# Patient Record
Sex: FEMALE | Race: WHITE | NOT HISPANIC OR LATINO | Employment: STUDENT | URBAN - METROPOLITAN AREA
[De-identification: names, ages, dates, MRNs, and addresses within clinical notes are randomized per-mention and may not be internally consistent; named-entity substitution may affect disease eponyms.]

---

## 2017-02-20 ENCOUNTER — GENERIC CONVERSION - ENCOUNTER (OUTPATIENT)
Dept: OTHER | Facility: OTHER | Age: 27
End: 2017-02-20

## 2017-03-24 ENCOUNTER — ALLSCRIPTS OFFICE VISIT (OUTPATIENT)
Dept: OTHER | Facility: OTHER | Age: 27
End: 2017-03-24

## 2017-06-16 ENCOUNTER — ALLSCRIPTS OFFICE VISIT (OUTPATIENT)
Dept: OTHER | Facility: OTHER | Age: 27
End: 2017-06-16

## 2017-09-18 ENCOUNTER — ALLSCRIPTS OFFICE VISIT (OUTPATIENT)
Dept: OTHER | Facility: OTHER | Age: 27
End: 2017-09-18

## 2017-09-18 DIAGNOSIS — F98.8 OTHER SPECIFIED BEHAVIORAL AND EMOTIONAL DISORDERS WITH ONSET USUALLY OCCURRING IN CHILDHOOD AND ADOLESCENCE: ICD-10-CM

## 2017-12-08 ENCOUNTER — ALLSCRIPTS OFFICE VISIT (OUTPATIENT)
Dept: OTHER | Facility: OTHER | Age: 27
End: 2017-12-08

## 2018-01-10 NOTE — PSYCH
Psych Med Mgmt    Appearance: was calm and cooperative, adequate hygiene and grooming and good eye contact  Observed mood: anxious  Observed mood: affect appropriate  Speech: a normal rate and fluent  Thought processes: coherent/organized  Hallucinations: no hallucinations present  Thought Content: no delusions  Abnormal Thoughts: The patient has no suicidal thoughts and no homicidal thoughts  Orientation: The patient is oriented to person, place and time, oriented to person, oriented to place and oriented to time  Recent and Remote Memory: short term memory intact and long term memory intact  Attention Span And Concentration: concentration impaired  Insight: Limited insight  Judgment: Her judgment was limited  Muscle Strength And Tone  Muscle strength and tone were normal    The patient is experiencing no localized pain  Initial Evaluation provided today  Treatment Recommendations: Trial of Strattera 40 mg qam and increase to 80 mg after 3 days   Continue Alprazolam 0 5 mg qd prn  Risks, Benefits And Possible Side Effects Of Medications: Risks, benefits, and possible side effects of medications explained to patient and patient verbalizes understanding  She reports normal appetite, normal energy level, no weight change and normal number of sleep hours  Patient used to see Dr Rosalba Cloud for 2 years and her last tx was with Adderall and Wellbutrin and she ended having a seizure and she stated that she decided to switch  Patient started seeing Dr Rosalba Cloud for depression and anxiety and she was started Wellbutrin 300 mg and Sertraline (100 mg)and she stayed on that regimen for several years  She stated her first tx for depression while in college 21 yo  She was given the same medications at college  Dr Rosalab Cloud continued the antidepressants and added Clonazepam 0 5 mg bid and 0 25 qd prn  Alprazolam 0 5 mg prn panics  She reported panic attacks only prior to testing     She also started reporting difficulties with focusing and Dr Montes started her on Adderall 10 mg bid  Last Feb 2017 she had a seizure and hospital stopped all her medications Wellbutrin Adderall, and Xanax  She stated that she stopped Sertraline and Clonazepam a year prior to seizure because she was having sedation affecting her school work  She also stopped birth control Lo estrin with iron and she stated that since then she has not been feeling depressed  She stated at least for now she is not concerned about birth control since she is not dating  At the presents her concerns are about finding what medications are safe for her to take after having a seizure  She still has difficulties focusing and will like to know if it is safe to r/s Adderall and take Xanax prn  Vitals  Signs   Recorded: [id number] 11:55AM   Height: 5 ft 2 in  Weight: 130 lb   BMI Calculated: 23 78  BSA Calculated: 1 59    Assessment    1  Hypothyroidism, unspecified type (244 9) (E03 9)   2  Vitamin D deficiency (268 9) (E55 9)   3  MDD (major depressive disorder), recurrent severe, without psychosis (296 33) (F33 2)   4  TONYA (generalized anxiety disorder) (300 02) (F41 1)   5  Family history of Anxiety, generalized : Mother   6  Family history of Depression, major, single episode, moderate : Father   7  ADD (attention deficit disorder) (314 00) (F98 8)    Plan    1  Strattera 40 MG Oral Capsule; Take 1 cap po qam for 3 days then increase to 2   caps po qam    2  ALPRAZolam 0 5 MG Oral Tablet; TAKE 1 TABLET DAILY AS NEEDED    3  Levothyroxine Sodium 50 MCG Oral Tablet (Synthroid); TAKE 1 TABLET DAILY    4  Vitamin D 2000 UNIT Oral Capsule; TAKE 2 CAPSULE Once    Review of Systems    Constitutional: No fever, no chills, feels well, no tiredness, no recent weight gain or loss  Cardiovascular: no complaints of slow or fast heart rate, no chest pain, no palpitations     Respiratory: no complaints of shortness of breath, no wheezing, no dyspnea on exertion  Gastrointestinal: no complaints of abdominal pain, no constipation, no nausea, no diarrhea, no vomiting  Genitourinary: no complaints of dysuria, no incontinence, no pelvic pain, no urinary frequency  Musculoskeletal: no complaints of arthralgia, no myalgias, no limb pain, no joint stiffness  Neurological: no complaints of headache, no confusion, no numbness, no dizziness  Past Psychiatric History    Past Psychiatric History: Treated for depression and anxiety since age 21 yo  No Inpatient Psychiatric TX HX  Substance Abuse Hx    Substance Abuse History: Alcohol 1-2 drinks once or twice weeks   Denies tobacco  Denies recreational drug use  Past Medical History    The active problems and past medical history were reviewed and updated today  Allergies    1  No Known Drug Allergies    Current Meds    The medication list was reviewed and updated today  Family Psych History  Mother    1  Family history of Anxiety, generalized  Father    2  Family history of Depression, major, single episode, moderate    The family history was reviewed and updated today  Social History  The social history was reviewed and updated today  Single  Full time student   PA student at [id number]  57 with roommate in [place] parents live in [place] and one brother 30 yo that lives in [address] [place] [address] Meds    1  Strattera 40 MG Oral Capsule; Take 1 cap po qam for 3 days then increase to 2 caps po   qam;   Therapy: 61KIT0848 to (Evaluate:23May2017)  Requested for: 51CVI6929; Last   Rx:24Mar2017 Ordered    2  ALPRAZolam 0 5 MG Oral Tablet; TAKE 1 TABLET DAILY AS NEEDED; Therapy: 29BON6903 to (Evaluate:23May2017); Last Rx:24Mar2017 Ordered    3  Levothyroxine Sodium 50 MCG Oral Tablet (Synthroid); TAKE 1 TABLET DAILY; Therapy: 72QPN5902 to (Evaluate:10Pbk0872); Last Rx:24Mar2017 Ordered    4  Vitamin D 2000 UNIT Oral Capsule; TAKE 2 CAPSULE Once;    Therapy: 47HLB7294 to (NWCXNHAQ:86YID7664);  Last Rx:24Mar2017 Ordered    Signatures   Electronically signed by : GEOVANNA Jamison ; Mar 24 2017 12:23PM EST                       (Author)

## 2018-01-13 VITALS — HEIGHT: 62 IN | WEIGHT: 130 LBS | BODY MASS INDEX: 23.92 KG/M2

## 2018-01-14 NOTE — PSYCH
Psych Med Mgmt    Appearance: was calm and cooperative, adequate hygiene and grooming and good eye contact  Observed mood: euthymic, anxious and Nil anxiety, but mood appropriate  Observed mood: affect was broad, but affect appropriate  Speech: a normal rate and fluent   Normal volume  Thought processes: coherent/organized   Intact associations  Hallucinations: no hallucinations present  Thought Content: no delusions  Abnormal Thoughts: The patient has no suicidal thoughts and no homicidal thoughts  Orientation: The patient is oriented to person, place and time  Recent and Remote Memory: short term memory intact and long term memory intact  Attention Span And Concentration: concentration intact  Insight: Insight intact  Judgment: Her judgment was intact  Muscle Strength And Tone  Normal gait and station  Language: no difficulty naming common objects and no difficulty repeating a phrase  Fund of knowledge: Patient displays adequate knowledge of current events and adequate fund of knowledge regarding past history  The patient is experiencing no localized pain  Treatment Recommendations: Pt's ADD Sxs and anxiety are under control with the present regimen which I will continue but reduce the afternoon dose of Amphetamine-Dextro since she is needing it less  Treatment plan done  Pt accepts the plan but is not interested in psychotherapy  Reduce Amphetamine Dextro 5mg (1) tab po qd at 3PM on heavy study days --Pt not needing more at present, has pills left from prior Rx  Continue:  Amphetamine 10mg (1) tab po qAM # 30 + 30 + 30  Alprazolam 0 5mg (1) tab po qd prn anxiety--Pt has pills from prior Rx, not needing more  Get LFT  Return 3 months, sooner prn  Risks, Benefits And Possible Side Effects Of Medications: Risks, benefits, and possible side effects of medications explained to patient and patient verbalizes understanding, Risks of medications explained if female patient   Patient verbalizes understanding and agrees to notify her doctor if she becomes pregnant  The patient has been filling controlled prescriptions on time as prescribed to [facility]    Pt continues the Amphetamine-Dextro   She reports normal appetite, normal energy level, no weight change and normal number of sleep hours  Soumya Little is a 26y/o female here for medication review with primary c/o "Just a little bit of the dry mouth " She is drinking a bit more and bought Biotene to improve this SE  However, it is tolerable  Pt states she is not needing the afternoon dose of Amphetamine-Dextro as frequently  Her focus is good in the day and at times the second dose can keep her awake so she uses it when she has heavier study days  In regards to anxiety, it has been nil and she has not needed the Alprazolam much  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 and feels that she is performing well  Pt states she has not had any depressive Sxs since 2/2017 when she stopped the OCP  Pt presently denies depression, SI, HI, panic attacks, or manic or psychotic Sxs  She has approx 1 ETOH drink per week and denies any illicit drug use/abuse  Vitals  Signs   Recorded: 18Sep2017 08:57AM   Heart Rate: 585  Systolic: 904, LUE, Sitting  Diastolic: 79, LUE, Sitting  Height: 5 ft 2 in  Weight: 126 lb   BMI Calculated: 23 05  BSA Calculated: 1 57  LMP: Approx [id number]    Assessment    1  ADD (attention deficit disorder) (314 00) (F98 8)   2  TONYA (generalized anxiety disorder) (300 02) (F41 1)   3  MDD (major depressive disorder), recurrent severe, without psychosis (296 33) (F33 2)    Plan    1  Amphetamine-Dextroamphetamine 10 MG Oral Tablet; Take 1 tab by mouth each   morning; Do Not Fill Before: [id number]   2  (1) HEPATIC FUNCTION PANEL; Status:Active;  Requested for:03Oif3916;     Review of Systems    Constitutional: No fever, no chills, feels well, no tiredness, no recent weight gain or loss  Cardiovascular: no complaints of slow or fast heart rate, no chest pain, no palpitations  Respiratory: no complaints of shortness of breath, no wheezing, no dyspnea on exertion  Gastrointestinal: no complaints of abdominal pain, no constipation, no nausea, no diarrhea, no vomiting  Genitourinary: no complaints of dysuria, no incontinence, no pelvic pain, no urinary frequency  Musculoskeletal: no complaints of arthralgia, no myalgias, no limb pain, no joint stiffness  Integumentary: no complaints of skin rash, no itching, no dry skin  Neurological: no complaints of headache, no confusion, no numbness, no dizziness  Past Psychiatric History    Past Psychiatric History: Pt's first psychiatrist was in [place] when she was in [facility]  Then seen by Dr Clare Jackson before Dr Regine Bob  Pt tried psychotherapy approx 5 years ago but did not find it helpful  Pt feels her depression first began with life transitions (starting college)--, also being on Accutane, and the OCP at that time  Adderall and Wellbutrin combo---caused seizure, Strattera    Pt denies any h/o suicide attempts, self-harm behaviors, violent behaviors, psychiatric hospitalizations, ECT, or legal or  Hx  Pt tried: Sertraline, Wellbutrin (seizure), Clonazepam         Substance Abuse Hx    Substance Abuse History: Pt denies h/o ETOH or illicit drug abuse  Active Problems    1  ADD (attention deficit disorder) (314 00) (F98 8)   2  TONYA (generalized anxiety disorder) (300 02) (F41 1)   3  Hypothyroidism, unspecified type (244 9) (E03 9)   4  MDD (major depressive disorder), recurrent severe, without psychosis (296 33) (F33 2)   5  Vitamin D deficiency (268 9) (E55 9)    Past Medical History    The active problems and past medical history were reviewed and updated today  Allergies    1  Wellbutrin    Current Meds   1  ALPRAZolam 0 5 MG Oral Tablet; take 1 tablet daily as needed;    Therapy: 70GQL7301 to (Evaluate:48Vle6395); Last Rx:16Jun2017 Ordered   2  Amphetamine-Dextroamphetamine 10 MG Oral Tablet; Take 1 tab by mouth each   morning; Therapy: 96PAM9307 to (Evaluate:33Epw4969); Last Rx:16Jun2017 Ordered   3  Amphetamine-Dextroamphetamine 5 MG Oral Tablet; TAKE 1 TABLET  BY MOUTH AT   3PM;   Therapy: 28JES7296 to (Evaluate:63Myo7716); Last Rx:16Jun2017 Ordered   4  Levothyroxine Sodium 50 MCG Oral Tablet; TAKE 1 TABLET DAILY; Therapy: 67NSQ9810 to (Evaluate:23Apr2017); Last Rx:24Mar2017 Ordered   5  Vitamin D 2000 UNIT Oral Capsule; TAKE 2 CAPSULE Once; Therapy: 32DLM1316 to (Evaluate:22Jun2017); Last Rx:24Mar2017 Ordered    The medication list was reviewed and updated today  Family Psych History  Mother    1  Family history of Anxiety, generalized  Father    2  Family history of Depression, major, single episode, moderate    The family history was reviewed and updated today  Social History    · Currently in school   · Has no children   · Single  The social history was reviewed and updated today  No romantic relationship at the moment    Pt got a new roommate and getting along well  History Of Phys/Sex Abuse Or Perpetration    History Of Phys/Sex Abuse or Perpetration: Pt denies any h/o physical or sexual abuse  End of Encounter Meds    1  Amphetamine-Dextroamphetamine 10 MG Oral Tablet; Take 1 tab by mouth each   morning; Therapy: 22WXO2894 to (Evaluate:77Rvz7254); Last Rx:32Vbp8235 Ordered   2  Amphetamine-Dextroamphetamine 5 MG Oral Tablet; TAKE 1 TABLET  BY MOUTH AT   3PM;   Therapy: 14ZID2713 to (Evaluate:73Qcd7919); Last Rx:16Jun2017 Ordered    3  ALPRAZolam 0 5 MG Oral Tablet; take 1 tablet daily as needed; Therapy: 52HWK1023 to (Evaluate:27Ofb4899); Last Rx:16Jun2017 Ordered    4  Levothyroxine Sodium 50 MCG Oral Tablet (Synthroid); TAKE 1 TABLET DAILY; Therapy: 22IEN1661 to (Evaluate:93Xit7194); Last Rx:24Mar2017 Ordered    5   Vitamin D 2000 UNIT Oral Capsule; TAKE 2 CAPSULE Once; Therapy: 86VKK0729 to (Evaluate:22Jun2017);  Last Rx:24Mar2017 Ordered    Future Appointments    Date/Time Provider Specialty Site   12/08/2017 09:00 AM MADY Dumont Psychiatry [facility]     Signatures   Electronically signed by : Hardy Boeck, RPA-C; Sep 18 2017  9:09AM EST                       (Author)    Electronically signed by : GEOVANNA Hutson ; Sep 18 2017  1:11PM EST                       (Author)

## 2018-01-14 NOTE — PSYCH
Psych Med Mgmt    Appearance: was calm and cooperative, adequate hygiene and grooming and good eye contact  Observed mood: euthymic and anxious, but mood appropriate  Observed mood: affect was broad, but affect appropriate  Speech: a normal rate and fluent   Normal volume  Thought processes: coherent/organized   Intact associations  Hallucinations: no hallucinations present  Thought Content: no delusions  Abnormal Thoughts: The patient has no suicidal thoughts and no homicidal thoughts  Orientation: The patient is oriented to person, place and time  Recent and Remote Memory: short term memory intact and long term memory intact  Attention Span And Concentration: concentration intact  Insight: Insight intact  Judgment: Her judgment was intact  Muscle Strength And Tone  Normal gait and station  Language: no difficulty naming common objects and no difficulty repeating a phrase  Fund of knowledge: Patient displays adequate knowledge of current events, adequate fund of knowledge regarding past history and adequate fund of knowledge regarding vocabulary  The patient is experiencing no localized pain  Treatment Recommendations: Pt's ADD Sxs are suboptimal with the ER formulation and she would like to switch back to regular release, which she had in the past  I will do so and split the dose for coverage through her day  I advised good hydration and options such as biotene or a sugarless lozenge to keep mouth moist  Pt does have a psychotherapist and is not interested in this at present  Pt accepts the plan  D/C Amphetamine ER  Start Amphetamine 10mg (1) tab po qAM # 30 + 30 + 30 and Amphetamine 5mg (1) tab po q3PM # 30 + 30 + 30  Continue:  Alprazolam 0 5mg (1) tab po qd prn anxiety # 30 R1  Vit D  Have results of recent labwork sent to [facility] Return 3 months, sooner prn   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        Risks, Benefits And Possible Side Effects Of Medications: Risks, benefits, and possible side effects of medications explained to patient and patient verbalizes understanding, Risks of medications explained if female patient  Patient verbalizes understanding and agrees to notify her doctor if she becomes pregnant  Discussed with patient the risks of sedation, respiratory depression, impairment of ability to drive and potential for abuse and addiction related to treatment with benzodiazepine medications  The patient understands risk of treatment with benzodiazepine medications, agrees to not drive if feels impaired and agrees to take medications as prescribed  The patient has been filling controlled prescriptions on time as prescribed to [employer] [facility] program     She reports decreased appetite, normal energy level, no weight change and normal number of sleep hours  Paulo Hill is a 28y/o female with h/o MDD, TONYA and ADD, here for medication review with primary c/o "A little anxious about school " Pt is attending Physician Assistant schooling at [employer]  She gets test anxiety most specifically, but feels it is manageable with the Alprazolam  She also states she does not feel the ER formulation of Amphetamine is as effective as the immediate release, but lasts longer  Her attention wanes and she does not feel as mentally sharp  She would prefer the immediate release  Pt presently denies depression, SI, HI, panic attacks, or manic or psychotic Sxs  Pt denies any ETOH or illicit drug abuse  She reports compliance to her medications with tolerable SE of dry mouth  Pt had a seizure in 2/2017 attributed to Wellbutrin, but the medication was stopped  She has otherwise never had a seizure before or since that time  She also states her thyroid is under control        Vitals  Signs   Recorded: 16Jun2017 09:05AM   Heart Rate: 84  Systolic: 991, LUE, Sitting  Diastolic: 72, LUE, Sitting  Height: 5 ft 2 in  Weight: 128 lb   BMI Calculated: 23 41  BSA Calculated: 1 58  LMP: [id number]    Assessment    1  TONYA (generalized anxiety disorder) (300 02) (F41 1)   2  ADD (attention deficit disorder) (314 00) (F98 8)   3  MDD (major depressive disorder), recurrent severe, without psychosis (296 33) (F33 2)   4  Vitamin D deficiency (268 9) (E55 9)   5  Hypothyroidism, unspecified type (244 9) (E03 9)    Plan    1  Amphetamine-Dextroamphetamine 10 MG Oral Tablet; Take 1 tab by mouth each   morning; Do Not Fill Before: 09JOG2835   2  Amphetamine-Dextroamphetamine 5 MG Oral Tablet; TAKE 1 TABLET  BY   MOUTH AT 3PM; Do Not Fill Before: 11Aug2017    3  From  ALPRAZolam 0 5 MG Oral Tablet TAKE 1 TABLET DAILY AS NEEDED To   ALPRAZolam 0 5 MG Oral Tablet take 1 tablet daily as needed    Review of Systems    Constitutional: No fever, no chills, feels well, no tiredness, no recent weight gain or loss  Cardiovascular: no complaints of slow or fast heart rate, no chest pain, no palpitations  Respiratory: no complaints of shortness of breath, no wheezing, no dyspnea on exertion  Gastrointestinal: no complaints of abdominal pain, no constipation, no nausea, no diarrhea, no vomiting  Genitourinary: no complaints of dysuria, no incontinence, no pelvic pain, no urinary frequency  Musculoskeletal: no complaints of arthralgia, no myalgias, no limb pain, no joint stiffness  Integumentary: no complaints of skin rash, no itching, no dry skin  Neurological: no complaints of headache, no confusion, no numbness, no dizziness  Past Psychiatric History    Past Psychiatric History: Pt's first psychiatrist was in [place] when she was in [facility]  Then seen by Dr Kim Blank before Dr Debbie Lowe  Pt tried psychotherapy approx 5 years ago but did not find it helpful  Pt feels her depression first began with life transitions (starting college)--, also being on Accutane, and the OCP at that time       Adderall and Wellbutrin combo---caused seizure, Strattera    Pt denies any h/o suicide attempts, self-harm behaviors, violent behaviors, psychiatric hospitalizations, ECT, or legal or  Hx  Pt tried: Sertraline, Wellbutrin (seizure), Clonazepam         Substance Abuse Hx    Substance Abuse History: Pt denies h/o ETOH or illicit drug abuse  Active Problems    1  ADD (attention deficit disorder) (314 00) (F98 8)   2  TONYA (generalized anxiety disorder) (300 02) (F41 1)   3  Hypothyroidism, unspecified type (244 9) (E03 9)   4  MDD (major depressive disorder), recurrent severe, without psychosis (296 33) (F33 2)   5  Vitamin D deficiency (268 9) (E55 9)    Past Medical History    The active problems and past medical history were reviewed and updated today  Allergies    1  Wellbutrin    Current Meds   1  ALPRAZolam 0 5 MG Oral Tablet; TAKE 1 TABLET DAILY AS NEEDED; Therapy: 06NLX0670 to (Evaluate:87Uaq0611); Last Rx:24Mar2017 Ordered   2  Levothyroxine Sodium 50 MCG Oral Tablet; TAKE 1 TABLET DAILY; Therapy: 65EKJ3007 to (Evaluate:08Thb0542); Last Rx:24Mar2017 Ordered   3  Vitamin D 2000 UNIT Oral Capsule; TAKE 2 CAPSULE Once; Therapy: 41EWW6188 to (Evaluate:63Epc3064); Last Rx:24Mar2017 Ordered    The medication list was reviewed and updated today  Family Psych History  Mother    1  Family history of Anxiety, generalized  Father    2  Family history of Depression, major, single episode, moderate    The family history was reviewed and updated today  Social History    · Currently in school   · Has no children   · Single  The social history was reviewed and updated today  No romantic relationship at the moment      History Of Phys/Sex Abuse Or Perpetration    History Of Phys/Sex Abuse or Perpetration: Pt denies any h/o physical or sexual abuse  End of Encounter Meds    1  Amphetamine-Dextroamphetamine 10 MG Oral Tablet; Take 1 tab by mouth each   morning;    Therapy: 30TAT0068 to (Evaluate:10Sep2017); Last Rx:16Jun2017 Ordered   2  Amphetamine-Dextroamphetamine 5 MG Oral Tablet; TAKE 1 TABLET  BY MOUTH AT   3PM;   Therapy: 11AFE3232 to (Evaluate:53Nsp9797); Last Rx:16Jun2017 Ordered    3  ALPRAZolam 0 5 MG Oral Tablet; take 1 tablet daily as needed; Therapy: 09DTL6895 to (Evaluate:00Ahp7588); Last Rx:16Jun2017 Ordered    4  Levothyroxine Sodium 50 MCG Oral Tablet (Synthroid); TAKE 1 TABLET DAILY; Therapy: 22VGQ1091 to (Evaluate:23Apr2017); Last Rx:24Mar2017 Ordered    5  Vitamin D 2000 UNIT Oral Capsule; TAKE 2 CAPSULE Once; Therapy: 86TWS3371 to (Evaluate:22Jun2017);  Last Rx:24Mar2017 Ordered    Future Appointments    Date/Time Provider Specialty Site   09/18/2017 08:30 AM Leopold Hausen, RPA-C Psychiatry [facility] 81     Signatures   Electronically signed by : MADY Rodriguez; Jun 16 2017  9:20AM EST                       (Author)    Electronically signed by : GEOVANNA Amador ; Jun 16 2017 11:48AM EST                       (Author)

## 2018-01-15 NOTE — PSYCH
Behavioral Health Outpatient Intake    Referred By: DR Soumya Davis  Intake Questions: there are no developmental disabilities  the patient does not have a hearing impairment  the patient does not have an ICM or CTT  patient is not taking injectable psychiatric medications  Employment: The patient is not employed  full time at [employer]  Emergency Contact Information:   Emergency Contact: KEYANA BAZZI   Relationship to Patient: MOTHER   Phone Number: [phone]   Previous Psychiatric Treatment: She has previously been seen by a psychiatrist  Ingrid Uriostegui, 3 MONTHS AGO  She has previously been seen by a therapist  Shaina Spence   History: no  service  She has not had combat service  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 as a member of the [employer] or [employer]  Insurance Subscriber: PONCE   Primary Insurance: SELF PAY         Presenting Problem (in patient's words): PT WAS SEEING DR MUELLER, HE HAD HER ON WELLBUTRIN, SHE HAD A SEIZURE AND NOW IS OFF ALL MEDICATION, SHE STILL SUFFERING FROM DEPRESSION  Substance Abuse: NONE  Previous Treatment: The patient has not been seen here in the past      Accepted as Patient   DR Ula Hashimoto 3/24/17@ 11:00 PER DIANA ALDRIDGE     Primary Care Physician: Taylor Gillis       Signatures   Electronically signed by : Caridad Conklin, ; Feb 20 2017  9:55AM EST                       (Author)

## 2018-01-22 VITALS
SYSTOLIC BLOOD PRESSURE: 117 MMHG | DIASTOLIC BLOOD PRESSURE: 79 MMHG | HEIGHT: 62 IN | BODY MASS INDEX: 23.19 KG/M2 | WEIGHT: 126 LBS | HEART RATE: 102 BPM

## 2018-01-22 VITALS
SYSTOLIC BLOOD PRESSURE: 113 MMHG | WEIGHT: 128 LBS | DIASTOLIC BLOOD PRESSURE: 72 MMHG | BODY MASS INDEX: 23.55 KG/M2 | HEART RATE: 84 BPM | HEIGHT: 62 IN

## 2018-01-22 VITALS — BODY MASS INDEX: 22.26 KG/M2 | HEIGHT: 62 IN | WEIGHT: 121 LBS

## 2018-01-23 NOTE — PSYCH
Psych Med Mgmt    Appearance: was calm and cooperative, adequate hygiene and grooming and good eye contact  Observed mood: euthymic and anxious, but mood appropriate  Observed mood: affect was broad and affect was tearful, but affect appropriate   At one point became tearful due to frustration regarding her anxiety  Speech: a normal rate and fluent   Normal volume  Thought processes: coherent/organized   Intact associations  Hallucinations: no hallucinations present  Thought Content: no delusions  Abnormal Thoughts: The patient has no suicidal thoughts and no homicidal thoughts  Orientation: The patient is oriented to person, place and time  Recent and Remote Memory: short term memory intact and long term memory intact  Attention Span And Concentration: concentration intact  Insight: Insight intact  Judgment: Her judgment was intact  Muscle Strength And Tone  Normal gait and station  Language: no difficulty naming common objects and no difficulty repeating a phrase  Fund of knowledge: Patient displays adequate knowledge of current events, adequate fund of knowledge regarding past history and adequate fund of knowledge regarding vocabulary  The patient is experiencing no localized pain  Treatment Recommendations: Pt is having difficulties concentrating in the afternoon but had not been taking the afternoon dose of stimulant  I advised her to resume the 5mg and take it earlier  Anxiety is moderate and I discussed adding a steady anxiety medicine--ie SSRI/SNRI but Pt refused she does not want additional medicine for 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 I encouraged use of Alprazolam prn  Pt showed me the CMP result from [facility] on her phone and CMP was normal  I advised psychotherapy for coping skills but Pt cannot fit in her schedule at present  Treatment plan done and Pt accepts the plan    Continue:  Amphetamine 5mg (1) tab po qd at 1:00PM # 30 + 30 + 30  Amphetamine 10mg (1) tab po qAM # 30 + 30 + 30  Alprazolam 0 5mg (1) tab po qd prn anxiety # 30 R2  Bring formal copy of recent lab results  Return 3 months, call sooner prn  Risks, Benefits And Possible Side Effects Of Medications: Risks, benefits, and possible side effects of medications explained to patient and patient verbalizes understanding  Discussed with patient the risks of sedation, respiratory depression, impairment of ability to drive and potential for abuse and addiction related to treatment with benzodiazepine medications  The patient understands risk of treatment with benzodiazepine medications, agrees to not drive if feels impaired and agrees to take medications as prescribed  The patient has been filling controlled prescriptions on time as prescribed to [facility] program     She reports normal appetite, decreased energy, no weight change and normal number of sleep hours  Missael Rosenthal is a 26y/o female here for medication review with primary c/o/Area of need: "I can't concentrate as well" in the afternoon  She has not been taking the 3PM Amphetamine 5mg dose and realizes that she needs it  She feels her focus waning between 12PM and 1PM  Her anxiety increases around test time and during social situations where the focus is on her to speak--ie case presentations for schooling  Alprazolam helps partially  She drinks approx 24-32oz coffee per day  Pt is in [employer] school and anticipates graduating in 8/2018  Pt presently denies any mood Sxs panic attacks, or manic or psychotic Sxs  Personal strengths: "I feel like I have good insight into my issues and actively try to combat them "  Vitals  Signs   Recorded: [id number] 09:14AM   Height: 5 ft 2 in  Weight: 121 lb   BMI Calculated: 22 13  BSA Calculated: 1 54  LMP: Approx [id number]    Assessment    1  ADD (attention deficit disorder) (314 00) (F98 8)   2  TONYA (generalized anxiety disorder) (300 02) (F41 1)   3   Major depression in remission (296 25) (F32 5)    Plan    1  Amphetamine-Dextroamphetamine 10 MG Oral Tablet; Take 1 tab by mouth each   morning; Do Not Fill Before: [id number]   2  Amphetamine-Dextroamphetamine 5 MG Oral Tablet; TAKE 1 TABLET  BY   MOUTH AT 1PM; Do Not Fill Before: [id number]    3  ALPRAZolam 0 5 MG Oral Tablet; take 1 tablet daily as needed    Review of Systems    Constitutional: feeling tired  Cardiovascular: no complaints of slow or fast heart rate, no chest pain, no palpitations  Respiratory: no complaints of shortness of breath, no wheezing, no dyspnea on exertion  Gastrointestinal: no complaints of abdominal pain, no constipation, no nausea, no diarrhea, no vomiting  Genitourinary: no complaints of dysuria, no incontinence, no pelvic pain, no urinary frequency  Musculoskeletal: no complaints of arthralgia, no myalgias, no limb pain, no joint stiffness  Neurological: no complaints of headache, no confusion, no numbness, no dizziness  Past Psychiatric History    Past Psychiatric History: Pt's first psychiatrist was in [place] when she was in [facility]  Then seen by Dr Jody Villalta before Dr Noe Amen  Pt tried psychotherapy approx 5 years ago but did not find it helpful  Pt feels her depression first began with life transitions (starting college)--, also being on Accutane, and the OCP at that time  Adderall and Wellbutrin combo---caused seizure, Strattera    Pt denies any h/o suicide attempts, self-harm behaviors, violent behaviors, psychiatric hospitalizations, ECT, or legal or  Hx  Pt tried: Sertraline, Wellbutrin (seizure), Clonazepam         Substance Abuse Hx    Substance Abuse History: Pt denies h/o ETOH or illicit drug abuse  Active Problems    1  ADD (attention deficit disorder) (314 00) (F98 8)   2  TONYA (generalized anxiety disorder) (300 02) (F41 1)   3  Hypothyroidism, unspecified type (244 9) (E03 9)   4   Vitamin D deficiency (268 9) (E55 9)    Past Medical History    The active problems and past medical history were reviewed and updated today  Allergies    1  Wellbutrin   2  Spironolactone TABS    Current Meds   1  ALPRAZolam 0 5 MG Oral Tablet; take 1 tablet daily as needed; Therapy: 50FWV6161 to (Evaluate:45Jkx8616); Last Rx:16Jun2017 Ordered   2  Amphetamine-Dextroamphetamine 10 MG Oral Tablet; Take 1 tab by mouth each   morning; Therapy: 78BBI6239 to (Evaluate:05Kzs0645); Last Rx:[id number] Ordered   3  Amphetamine-Dextroamphetamine 5 MG Oral Tablet; TAKE 1 TABLET  BY MOUTH AT   3PM;   Therapy: 12PVG0803 to (Evaluate:85Mlj9066); Last Rx:97Vzv9573 Ordered   4  Levothyroxine Sodium 50 MCG Oral Tablet; TAKE 1 TABLET DAILY; Therapy: 86ILM6283 to (Evaluate:92Eje7727); Last Rx:72Gog5952 Ordered   5  Vitamin D 2000 UNIT Oral Capsule; TAKE 2 CAPSULE Once; Therapy: 54DQO0011 to (Evaluate:68Iga6683); Last Rx:24Mar2017 Ordered    The medication list was reviewed and updated today  Family Psych History  Mother    1  Family history of Anxiety, generalized  Father    2  Family history of Depression, major, single episode, moderate    The family history was reviewed and updated today  Social History    · Currently in school   · Has no children   · Single  The social history was reviewed and updated today  The social history was reviewed and is unchanged  No romantic relationship at the moment    Pt got a new roommate in 8/2017 and getting along well  History Of Phys/Sex Abuse Or Perpetration    History Of Phys/Sex Abuse or Perpetration: Pt denies any h/o physical or sexual abuse  Education  Pt denies any h/o learning disabilities and reached childhood milestones on time as far as she knows  Graduated [employer] 2009  Graduated college 2013 with BS in Kinesiology  Pt is in [address] [place] [address] at [employer], anticipates graduation in 8/2018     End of Encounter Meds    1  Amphetamine-Dextroamphetamine 10 MG Oral Tablet;  Take 1 tab by mouth each morning; Therapy: 15GDL3801 to (Evaluate:04Mar2018); Last Rx:[id number] Ordered   2  Amphetamine-Dextroamphetamine 5 MG Oral Tablet; TAKE 1 TABLET  BY MOUTH AT   1PM;   Therapy: 17BLJ8386 to (Evaluate:04Mar2018); Last Rx:47Jvp1004 Ordered    3  ALPRAZolam 0 5 MG Oral Tablet; take 1 tablet daily as needed; Therapy: 76XJZ3111 to (Evaluate:08Mar2018); Last Rx:33Yxi6170 Ordered    4  Levothyroxine Sodium 50 MCG Oral Tablet (Synthroid); TAKE 1 TABLET DAILY; Therapy: 49BKT5822 to (Evaluate:23Apr2017); Last Rx:24Mar2017 Ordered    5  Vitamin D 2000 UNIT Oral Capsule; TAKE 2 CAPSULE Once; Therapy: 03UAW1792 to (Evaluate:22Jun2017);  Last Rx:24Mar2017 Ordered    Future Appointments    Date/Time Provider Specialty Site   03/05/2018 08:30 AM MADY Lowe Psychiatry [facility]     Signatures   Electronically signed by : MADY Smith; Dec  8 2017  9:47AM EST                       (Author)    Electronically signed by : GEOVANNA Germain ; Dec 11 2017 10:34AM EST                       (Author)    Electronically signed by : GEOVANNA Germain ; Dec 11 2017 10:34AM EST                       (Author)

## 2018-03-02 PROBLEM — F41.1 GAD (GENERALIZED ANXIETY DISORDER): Status: ACTIVE | Noted: 2017-03-24

## 2018-03-02 PROBLEM — F98.8 ADD (ATTENTION DEFICIT DISORDER): Status: ACTIVE | Noted: 2017-03-24

## 2018-03-02 PROBLEM — E55.9 VITAMIN D DEFICIENCY: Status: ACTIVE | Noted: 2017-03-24

## 2018-03-02 PROBLEM — F32.5 MAJOR DEPRESSION IN REMISSION (HCC): Status: ACTIVE | Noted: 2017-12-08

## 2018-03-02 PROBLEM — E03.9 HYPOTHYROIDISM: Status: ACTIVE | Noted: 2017-03-24

## 2018-03-05 ENCOUNTER — OFFICE VISIT (OUTPATIENT)
Dept: PSYCHIATRY | Facility: CLINIC | Age: 28
End: 2018-03-05
Payer: COMMERCIAL

## 2018-03-05 VITALS — HEIGHT: 62 IN | WEIGHT: 124.2 LBS | BODY MASS INDEX: 22.86 KG/M2

## 2018-03-05 DIAGNOSIS — E55.9 VITAMIN D DEFICIENCY: ICD-10-CM

## 2018-03-05 DIAGNOSIS — F41.1 GAD (GENERALIZED ANXIETY DISORDER): Primary | ICD-10-CM

## 2018-03-05 DIAGNOSIS — F98.8 ATTENTION DEFICIT DISORDER (ADD) WITHOUT HYPERACTIVITY: ICD-10-CM

## 2018-03-05 DIAGNOSIS — E03.8 OTHER SPECIFIED HYPOTHYROIDISM: ICD-10-CM

## 2018-03-05 DIAGNOSIS — F32.5 MAJOR DEPRESSION IN REMISSION (HCC): ICD-10-CM

## 2018-03-05 PROCEDURE — 99213 OFFICE O/P EST LOW 20 MIN: CPT | Performed by: PHYSICIAN ASSISTANT

## 2018-03-05 RX ORDER — DEXTROAMPHETAMINE SACCHARATE, AMPHETAMINE ASPARTATE, DEXTROAMPHETAMINE SULFATE AND AMPHETAMINE SULFATE 1.25; 1.25; 1.25; 1.25 MG/1; MG/1; MG/1; MG/1
5 TABLET ORAL DAILY
Qty: 30 TABLET | Refills: 0 | Status: SHIPPED | OUTPATIENT
Start: 2018-03-05 | End: 2018-03-05 | Stop reason: SDUPTHER

## 2018-03-05 RX ORDER — MULTIVIT-MIN/IRON/FOLIC ACID/K 18-600-40
2 CAPSULE ORAL
COMMUNITY
Start: 2017-03-24

## 2018-03-05 RX ORDER — DEXTROAMPHETAMINE SACCHARATE, AMPHETAMINE ASPARTATE, DEXTROAMPHETAMINE SULFATE AND AMPHETAMINE SULFATE 1.25; 1.25; 1.25; 1.25 MG/1; MG/1; MG/1; MG/1
TABLET ORAL
COMMUNITY
Start: 2017-06-16 | End: 2018-03-05 | Stop reason: SDUPTHER

## 2018-03-05 RX ORDER — DEXTROAMPHETAMINE SACCHARATE, AMPHETAMINE ASPARTATE, DEXTROAMPHETAMINE SULFATE AND AMPHETAMINE SULFATE 2.5; 2.5; 2.5; 2.5 MG/1; MG/1; MG/1; MG/1
TABLET ORAL
COMMUNITY
Start: 2017-06-16 | End: 2018-03-05 | Stop reason: SDUPTHER

## 2018-03-05 RX ORDER — DEXTROAMPHETAMINE SACCHARATE, AMPHETAMINE ASPARTATE, DEXTROAMPHETAMINE SULFATE AND AMPHETAMINE SULFATE 2.5; 2.5; 2.5; 2.5 MG/1; MG/1; MG/1; MG/1
10 TABLET ORAL EVERY MORNING
Qty: 30 TABLET | Refills: 0 | Status: SHIPPED | OUTPATIENT
Start: 2018-03-05 | End: 2018-05-14 | Stop reason: DRUGHIGH

## 2018-03-05 RX ORDER — DEXTROAMPHETAMINE SACCHARATE, AMPHETAMINE ASPARTATE, DEXTROAMPHETAMINE SULFATE AND AMPHETAMINE SULFATE 1.25; 1.25; 1.25; 1.25 MG/1; MG/1; MG/1; MG/1
5 TABLET ORAL DAILY
Qty: 30 TABLET | Refills: 0 | Status: SHIPPED | OUTPATIENT
Start: 2018-03-05 | End: 2018-05-14 | Stop reason: DRUGHIGH

## 2018-03-05 RX ORDER — ALPRAZOLAM 0.5 MG/1
0.5 TABLET ORAL DAILY PRN
Qty: 30 TABLET | Refills: 2 | Status: SHIPPED | OUTPATIENT
Start: 2018-03-05 | End: 2018-05-14 | Stop reason: SDUPTHER

## 2018-03-05 RX ORDER — DEXTROAMPHETAMINE SACCHARATE, AMPHETAMINE ASPARTATE, DEXTROAMPHETAMINE SULFATE AND AMPHETAMINE SULFATE 2.5; 2.5; 2.5; 2.5 MG/1; MG/1; MG/1; MG/1
10 TABLET ORAL EVERY MORNING
Qty: 30 TABLET | Refills: 0 | Status: SHIPPED | OUTPATIENT
Start: 2018-03-05 | End: 2018-03-05 | Stop reason: SDUPTHER

## 2018-03-05 RX ORDER — HYDROXYZINE HYDROCHLORIDE 25 MG/1
TABLET, FILM COATED ORAL
Qty: 60 TABLET | Refills: 2 | Status: SHIPPED | OUTPATIENT
Start: 2018-03-05

## 2018-03-05 RX ORDER — ALPRAZOLAM 0.5 MG/1
1 TABLET ORAL DAILY PRN
COMMUNITY
Start: 2017-03-24 | End: 2018-03-05 | Stop reason: SDUPTHER

## 2018-03-05 RX ORDER — LEVOTHYROXINE SODIUM 0.05 MG/1
1 TABLET ORAL DAILY
COMMUNITY
Start: 2017-03-24

## 2018-03-05 NOTE — PSYCH
MEDICATION MANAGEMENT NOTE        [address] [facility]      Name and Date of Birth:  Sharad Ramesh 33 y o  1990    Date of Visit: March 5, 2018    HPI:    Brandon Ramos is here for medication review with primary c/o "A bit stressed out with school "  She is able to manage it with Alprazolam   Her mood is otherwise good, she is focusing well, and she presently denies depression, SI, HI, panic attacks, or manic or psychotic Sxs  Pt reports compliance to her medications without SE  Appetite Changes and Sleep: normal sleep, normal appetite, normal energy level     Personal strengths:  "Resilient" "Strong family support " "Take time to relax/destress when needed "      Review Of Systems:      Constitutional negative   ENT negative   Cardiovascular negative   Respiratory negative   Gastrointestinal negative   Genitourinary negative   Musculoskeletal negative   Integumentary negative   Neurological negative   Endocrine negative   Other Symptoms none       Past Psychiatric History:   Pt's first psychiatrist was in [place] when she was in [facility]  Then seen by Dr Nikki West before Dr Lizbet Rodriguez  Pt tried psychotherapy approx 5 years ago but did not find it helpful  Pt feels her depression first began with life transitions (starting college)--, also being on Accutane, and the OCP at that time  Adderall and Wellbutrin combo---caused seizure, Strattera    Pt denies any h/o suicide attempts, self-harm behaviors, violent behaviors, psychiatric hospitalizations, ECT, or legal or  Hx      Pt tried: Sertraline, Wellbutrin (seizure), Clonazepam        Traumatic History:   Abuse: none  Other Traumatic Events: none       Past Medical History:    Past Medical History:   Diagnosis Date    Hypothyroidism     Vitamin D deficiency        Substance Abuse History:    History   Alcohol use Not on file     History   Drug use: Unknown       Social History:    Social History     Social History    Marital status: Single     Spouse name: N/A    Number of children: 0    Years of education: N/A     Occupational History    Not on file  Social History Main Topics    Smoking status: Not on file    Smokeless tobacco: Not on file    Alcohol use Not on file    Drug use: Unknown    Sexual activity: Not on file     Other Topics Concern    Not on file     Social History Narrative         Pt got a new roommate in 8/2017 and getting along well  Education:    Pt denies any h/o learning disabilities and reached childhood milestones on time as far as she knows    Graduated [employer] 2009    Graduated college 2013 with BS in Kinesiology    Pt is in [address] [place] [address], anticipates graduation in 8/2018        Family Psychiatric History:     Family History   Problem Relation Age of Onset    Anxiety disorder Mother     Depression Father        History Review:  The following portions of the patient's history were reviewed and updated as appropriate: allergies, current medications, past family history, past medical history, past social history, past surgical history and problem list          OBJECTIVE:     Mental Status Evaluation:    Appearance casually dressed, good eye contact and hygiene   Behavior pleasant, cooperative, a bit fidgety in seat   Speech normal rate and volume   Mood anxious   Affect normal range and intensity   Thought Processes organized   Associations intact associations   Thought Content Concerns studies   Perceptual Disturbances: no auditory hallucinations, no visual hallucinations, does not appear responding to internal stimuli   Abnormal Thoughts  Risk Potential Suicidal ideation - None  Homicidal ideation - None  Potential for aggression - No   Orientation oriented to person, place, time/date, situation, day of week, month of year and year   Memory short term memory grossly intact, long term memory grossly intact   Cosciousness alert and awake Attention Span attention span and concentration are age appropriate   Intellect not formally assessed   Insight good   Judgement good   Muscle Strength and  Gait muscle strength and tone were normal, normal gait , normal balance   Language no difficulty naming common objects, no difficulty repeating a phrase, no difficulty writing a sentence   Fund of Knowledge adequate knowledge of current events  adequate fund of knowledge regarding past history  adequate fund of knowledge regarding vocabulary    Pain none   Pain Scale 0       Laboratory Results:  No current labwork results available    Assessment/plan:       There are no diagnoses linked to this encounter  PLAN:  Pt is having mild to moderate anxiety but feels she can cope for the most part, but is open to an additional adjunctive medication  She refuses a steady anxiety medicine  Options discussed and Pt will start Hydroxyzine prn anxiety  I recommended psychotherapy when she has time in her schedule  Treatment plan done and Pt accepts the plan  Start Hydroxyzine 25mg (1) tab po qd-bid prn anxiety # 60 R2  Continue:  Amphetamine 5mg (1) tab po qd at 1:00PM # 30 + 30 + 30  Amphetamine 10mg (1) tab po qAM # 30 + 30 + 30  Alprazolam 0 5mg (1) tab po qd prn anxiety # 30 R2  Vit D  F/U PMD per routine for hypothyroidism  Return 3 months, call sooner prn      Risks/Benefits      Risks, Benefits And Possible Side Effects Of Medications:    Risks, benefits, and possible side effects of medications explained to Rashawn and she verbalizes understanding and agreement for treatment  Risks of medications in pregnancy explained to Rashawn  She verbalizes understanding and agrees to notify her doctor if she becomes pregnant      Controlled Medication Discussion:     Rashawn has been filling controlled prescriptions on time as prescribed according to [address] [place] [address] Monitoring program

## 2018-05-14 ENCOUNTER — OFFICE VISIT (OUTPATIENT)
Dept: PSYCHIATRY | Facility: CLINIC | Age: 28
End: 2018-05-14
Payer: COMMERCIAL

## 2018-05-14 VITALS
BODY MASS INDEX: 22.54 KG/M2 | HEIGHT: 62 IN | DIASTOLIC BLOOD PRESSURE: 74 MMHG | SYSTOLIC BLOOD PRESSURE: 123 MMHG | HEART RATE: 84 BPM | WEIGHT: 122.5 LBS

## 2018-05-14 DIAGNOSIS — F98.8 ATTENTION DEFICIT DISORDER (ADD) WITHOUT HYPERACTIVITY: Primary | ICD-10-CM

## 2018-05-14 DIAGNOSIS — E55.9 VITAMIN D DEFICIENCY: ICD-10-CM

## 2018-05-14 DIAGNOSIS — F32.5 MAJOR DEPRESSION IN REMISSION (HCC): ICD-10-CM

## 2018-05-14 DIAGNOSIS — F41.1 GAD (GENERALIZED ANXIETY DISORDER): ICD-10-CM

## 2018-05-14 PROCEDURE — 99214 OFFICE O/P EST MOD 30 MIN: CPT | Performed by: PHYSICIAN ASSISTANT

## 2018-05-14 RX ORDER — ALPRAZOLAM 0.5 MG/1
0.5 TABLET ORAL DAILY PRN
Qty: 30 TABLET | Refills: 2 | Status: SHIPPED | OUTPATIENT
Start: 2018-05-14 | End: 2018-08-10 | Stop reason: SDUPTHER

## 2018-05-14 RX ORDER — DEXTROAMPHETAMINE SACCHARATE, AMPHETAMINE ASPARTATE, DEXTROAMPHETAMINE SULFATE AND AMPHETAMINE SULFATE 2.5; 2.5; 2.5; 2.5 MG/1; MG/1; MG/1; MG/1
10 TABLET ORAL
Qty: 60 TABLET | Refills: 0 | Status: SHIPPED | OUTPATIENT
Start: 2018-05-14 | End: 2018-05-14 | Stop reason: SDUPTHER

## 2018-05-14 RX ORDER — DEXTROAMPHETAMINE SACCHARATE, AMPHETAMINE ASPARTATE, DEXTROAMPHETAMINE SULFATE AND AMPHETAMINE SULFATE 2.5; 2.5; 2.5; 2.5 MG/1; MG/1; MG/1; MG/1
10 TABLET ORAL
Qty: 60 TABLET | Refills: 0 | Status: SHIPPED | OUTPATIENT
Start: 2018-05-14 | End: 2018-08-10 | Stop reason: SDUPTHER

## 2018-05-14 NOTE — PSYCH
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 which she is feeling "Pretty good" about  However, she feels her focus has been reduced for the past few months  She also states that her schooling has been more intense since 12/2017 and this may be the real issue  She feels "Mentally fatigued "    Pt feels her anxiety medicine is still working and she has not tried the Hydroxyzine yet  She reports compliance to her medications with tolerable dry mouth  Appetite Changes and Sleep: normal sleep, normal appetite, normal energy level    Review Of Systems:      Constitutional negative   ENT negative   Cardiovascular negative   Respiratory negative   Gastrointestinal negative   Genitourinary negative   Musculoskeletal negative   Integumentary negative   Neurological negative   Endocrine negative   Other Symptoms none       Past Psychiatric History:   Pt's first psychiatrist was in [place] when she was in [facility]    Then seen by Dr Rodolfo Whitmore before Dr Mireles Solders      Pt tried psychotherapy approx 5 years ago but did not find it helpful      Pt feels her depression first began with life transitions (starting college)--, also being on Accutane, and the OCP at that time       Adderall and Wellbutrin combo---caused seizure, Strattera     Pt denies any h/o suicide attempts, self-harm behaviors, violent behaviors, psychiatric hospitalizations, ECT, or legal or  Hx      Pt tried: Sertraline, Wellbutrin (seizure), Clonazepam      Traumatic History:      Abuse: none  Other Traumatic Events: none       Past Medical History:    Past Medical History:   Diagnosis Date    Hypothyroidism     Vitamin D deficiency        Substance Abuse History:    History   Alcohol Use    Yes     Comment: once weekly has a mixed drink     History   Drug Use No       Social History:    Social History     Social History    Marital status: Single     Spouse name: N/A    Number of children: 0    Years of education: N/A     Occupational History    PA student      Social History Main Topics    Smoking status: Never Smoker    Smokeless tobacco: Never Used    Alcohol use Yes      Comment: once weekly has a mixed drink    Drug use: No    Sexual activity: Yes     Partners: Male     Birth control/ protection: Condom     Other Topics Concern    Not on file     Social History Narrative         Pt got a new roommate in 8/2017 and getting along well  Education:    Pt denies any h/o learning disabilities and reached childhood milestones on time as far as she knows    Graduated [employer] 2009    Graduated college 2013 with BS in Kinesiology    Pt is in [address] [place] [address] at [employer], anticipates graduation in 8/2018        Family Psychiatric History:     Family History   Problem Relation Age of Onset    Anxiety disorder Mother     Depression Father        History Review:  The following portions of the patient's history were reviewed and updated as appropriate: allergies, current medications, past family history, past medical history, past social history, past surgical history and problem list          OBJECTIVE:     Mental Status Evaluation:    Appearance casually dressed, good eye conotact and hygiene   Behavior pleasant, cooperative, a bit fidgety in seat   Speech normal rate and volume   Mood anxious   Affect normal range and intensity   Thought Processes organized, goal directed, ruminative   Associations intact associations   Thought Content some ruminating thoughts   Perceptual Disturbances: no auditory hallucinations, no visual hallucinations, does not appear responding to internal stimuli   Abnormal Thoughts  Risk Potential Suicidal ideation - None  Homicidal ideation - None  Potential for aggression - No   Orientation oriented to person, place, time/date, situation, day of week, month of year and year   Memory short term memory grossly intact, long term memory grossly intact   Cosciousness alert and awake   Attention Span attention span and concentration are age appropriate   Intellect not formally assessed   Insight good   Judgement good   Muscle Strength and  Gait muscle strength and tone were normal, normal gait , normal balance   Language no difficulty naming common objects, no difficulty repeating a phrase, no difficulty writing a sentence   Fund of Knowledge adequate knowledge of current events  adequate fund of knowledge regarding past history  adequate fund of knowledge regarding vocabulary    Pain none   Pain Scale 0       Laboratory Results: LFT result not in computer  Pt states she had it done at an outside lab    Assessment/plan:       Diagnoses and all orders for this visit:    Attention deficit disorder (ADD) without hyperactivity  -     Discontinue: amphetamine-dextroamphetamine (ADDERALL) 10 mg tablet; Take 1 tablet (10 mg total) by mouth 2 (two) times a day for 30 days Take (1) tab in the morning and (1) tab at 1:00PM    For ongoing therapy Max Daily Amount: 20 mg  -     Discontinue: amphetamine-dextroamphetamine (ADDERALL) 10 mg tablet; Take 1 tablet (10 mg total) by mouth 2 (two) times a day for 30 days Take (1) tab in the morning and (1) tab at 1:00PM    For ongoing therapy Max Daily Amount: 20 mg  -     amphetamine-dextroamphetamine (ADDERALL) 10 mg tablet;  Take 1 tablet (10 mg total) by mouth 2 (two) times a day for 30 days Take (1) tab in the morning and (1) tab at 1:00PM    For ongoing therapy Max Daily Amount: 20 mg    TONYA (generalized anxiety disorder)  -     ALPRAZolam (XANAX) 0 5 mg tablet; Take 1 tablet (0 5 mg total) by mouth daily as needed for anxiety for up to 30 days    Major depression in remission ([facility] [address] )    Vitamin D deficiency        PLAN:  Pt is having impaired focus and I will increase Amphetamine-dextro for this  I also encouraged judicious use of anxiolytic medications prn  Pt does not have the time for psychotherapy due to her schooling  Treatment plan done and Pt accepts the plan  Increase:   Amphetamine 10mg (1) tab po qAM and (1) tab po q 1:00PM # 30 + 30 + 30  Continue:  Hydroxyzine 25mg (1) tab po qd-bid prn anxiety--Pt not needing a new Rx at present  Alprazolam 0 5mg (1) tab po qd prn anxiety # 30 R2  Vit D  Pt to have her lab send LFT results  F/U PMD per routine for hypothyroidism   Return 3 months, call sooner prn        Risks/Benefits      Risks, Benefits And Possible Side Effects Of Medications:    Risks, benefits, and possible side effects of medications explained to Trish Qiu and she verbalizes understanding and agreement for treatment  Risks of medications in pregnancy explained to Trish Qiu  She verbalizes understanding and agrees to notify her doctor if she becomes pregnant  Controlled Medication Discussion:     Discussed with Trish Qiu the risks of sedation, respiratory depression, impairment of ability to drive and potential for abuse and addiction related to treatment with benzodiazepine medications  She understands risk of treatment with benzodiazepine medications, agrees to not drive if feels impaired and agrees to take medications as prescribed  Discussed with Dorota Miranda warning on concurrent use of benzodiazepines and opioid medications including sedation, respiratory depression, coma and death  She understands the risk of treatment with benzodiazepines in addition to opioids and wants to continue taking those medications

## 2018-08-10 ENCOUNTER — OFFICE VISIT (OUTPATIENT)
Dept: PSYCHIATRY | Facility: CLINIC | Age: 28
End: 2018-08-10
Payer: COMMERCIAL

## 2018-08-10 VITALS — BODY MASS INDEX: 22.82 KG/M2 | HEIGHT: 62 IN | WEIGHT: 124 LBS

## 2018-08-10 DIAGNOSIS — F41.1 GAD (GENERALIZED ANXIETY DISORDER): Primary | ICD-10-CM

## 2018-08-10 DIAGNOSIS — E55.9 VITAMIN D DEFICIENCY: ICD-10-CM

## 2018-08-10 DIAGNOSIS — F32.5 MAJOR DEPRESSION IN REMISSION (HCC): ICD-10-CM

## 2018-08-10 DIAGNOSIS — F98.8 ATTENTION DEFICIT DISORDER (ADD) WITHOUT HYPERACTIVITY: ICD-10-CM

## 2018-08-10 PROCEDURE — 99214 OFFICE O/P EST MOD 30 MIN: CPT | Performed by: PHYSICIAN ASSISTANT

## 2018-08-10 RX ORDER — DEXTROAMPHETAMINE SACCHARATE, AMPHETAMINE ASPARTATE, DEXTROAMPHETAMINE SULFATE AND AMPHETAMINE SULFATE 2.5; 2.5; 2.5; 2.5 MG/1; MG/1; MG/1; MG/1
10 TABLET ORAL
Qty: 60 TABLET | Refills: 0 | Status: SHIPPED | OUTPATIENT
Start: 2018-08-10 | End: 2018-08-10 | Stop reason: SDUPTHER

## 2018-08-10 RX ORDER — DEXTROAMPHETAMINE SACCHARATE, AMPHETAMINE ASPARTATE, DEXTROAMPHETAMINE SULFATE AND AMPHETAMINE SULFATE 2.5; 2.5; 2.5; 2.5 MG/1; MG/1; MG/1; MG/1
10 TABLET ORAL
Qty: 60 TABLET | Refills: 0 | Status: SHIPPED | OUTPATIENT
Start: 2018-08-10 | End: 2018-09-09

## 2018-08-10 RX ORDER — ALPRAZOLAM 0.5 MG/1
0.5 TABLET ORAL DAILY PRN
Qty: 30 TABLET | Refills: 2 | Status: SHIPPED | OUTPATIENT
Start: 2018-08-10 | End: 2018-09-09

## 2018-08-10 NOTE — PSYCH
MEDICATION MANAGEMENT NOTE        [address] [facility]      Name and Date of Birth:  Mika Murray 33 y o  1990    Date of Visit: August 10, 2018    HPI:    Huy aMnning is here for medication review with primary c/o / Area of need:  "Need to find coping mechanisms when anxious "   She rates her anxiety 2-7/10--and it is presently 2/10  She is happy to be graduating from [employer] tomorrow  She takes her board exam at the end of September 2018 and does not have a job at present  Pt has not tried the Hydroxyzine yet but has been taking Alprazolam prn  Her focus is "Pretty good" on the Amphetamine-Dextro  She presently denies depression, SI, HI, panic attacks, or manic or psychotic Sxs  She reports compliance to the stimulant and BZD without SE  Appetite Changes and Sleep: normal sleep, normal appetite, normal energy level    Review Of Systems:      Constitutional negative   ENT negative   Cardiovascular negative   Respiratory negative   Gastrointestinal negative   Genitourinary negative   Musculoskeletal negative   Integumentary negative   Neurological negative   Endocrine negative   Other Symptoms none       Past Psychiatric History:   As copied from my 5/14/2018 note:     " [ Pt's first psychiatrist was in [place] when she was in [facility]    Then seen by Dr Farhad Mason before Dr Jacki Fang      Pt tried psychotherapy approx 5 years ago but did not find it helpful      Pt feels her depression first began with life transitions (starting college)--, also being on Accutane, and the OCP at that time       Adderall and Wellbutrin combo---caused seizure, Strattera     Pt denies any h/o suicide attempts, self-harm behaviors, violent behaviors, psychiatric hospitalizations, ECT, or legal or  Hx      Pt tried: Sertraline, Wellbutrin (seizure), Clonazepam       Traumatic History:       Abuse: none  Other Traumatic Events: none ] "      Past Medical History:    Past Medical History:   Diagnosis Date    Hypothyroidism     Vitamin D deficiency        Substance Abuse History:    History   Alcohol Use    Yes     Comment: once weekly has a mixed drink     History   Drug Use No       Social History:    Social History     Social History    Marital status: Single     Spouse name: N/A    Number of children: 0    Years of education: N/A     Occupational History    PA student      Social History Main Topics    Smoking status: Never Smoker    Smokeless tobacco: Never Used    Alcohol use Yes      Comment: once weekly has a mixed drink    Drug use: No    Sexual activity: Yes     Partners: Male     Birth control/ protection: Condom     Other Topics Concern    Not on file     Social History Narrative         Pt got a new roommate in 8/2017 and getting along well  Education:    Pt denies any h/o learning disabilities and reached childhood milestones on time as far as she knows    Graduated [employer] 2009    Graduated college 2013 with BS in Kinesiology    Pt is in [address] [place] [address] at [employer], anticipates graduation in 8/2018        Family Psychiatric History:     Family History   Problem Relation Age of Onset    Anxiety disorder Mother     Depression Father        History Review:  The following portions of the patient's history were reviewed and updated as appropriate: allergies, current medications, past family history, past medical history, past social history, past surgical history and problem list          OBJECTIVE:     Mental Status Evaluation:    Appearance Casually dressed, fair eye contact, good hygiene   Behavior Calm, cooperative, pleasant   Speech Clear, normal rate and volume   Mood Anxious   Affect Normal range and intensity   Thought Processes Organized, goal directed, ruminative   Associations intact associations   Thought Content some ruminating thoughts of her recent exams   Perceptual Disturbances: Pt denies any form of hallucinations and does not appear to be responding to internal stimuli   Abnormal Thoughts  Risk Potential Suicidal ideation - None  Homicidal ideation - None  Potential for aggression - No   Orientation oriented to person, place, time/date, situation, day of week, month of year and year   Memory short term memory grossly intact   Cosciousness alert and awake   Attention Span attention span and concentration are age appropriate   Intellect not formally assessed   Insight good   Judgement good   Muscle Strength and  Gait normal gait , normal balance   Language no difficulty naming common objects, no difficulty repeating a phrase, no difficulty writing a sentence   Fund of Knowledge adequate knowledge of current events  adequate fund of knowledge regarding past history  adequate fund of knowledge regarding vocabulary    Pain none   Pain Scale 0       Laboratory Results: No recent labwork    Assessment/plan:       Diagnoses and all orders for this visit:    TONYA (generalized anxiety disorder)  -     ALPRAZolam (XANAX) 0 5 mg tablet; Take 1 tablet (0 5 mg total) by mouth daily as needed for anxiety for up to 30 days    Attention deficit disorder (ADD) without hyperactivity  -     Discontinue: amphetamine-dextroamphetamine (ADDERALL) 10 mg tablet; Take 1 tablet (10 mg total) by mouth 2 (two) times a day for 30 days Take (1) tab in the morning and (1) tab at 1:00PM    For ongoing therapy Max Daily Amount: 20 mg  -     Discontinue: amphetamine-dextroamphetamine (ADDERALL) 10 mg tablet; Take 1 tablet (10 mg total) by mouth 2 (two) times a day for 30 days Take (1) tab in the morning and (1) tab at 1:00PM    For ongoing therapy Max Daily Amount: 20 mg  -     amphetamine-dextroamphetamine (ADDERALL) 10 mg tablet;  Take 1 tablet (10 mg total) by mouth 2 (two) times a day for 30 days Take (1) tab in the morning and (1) tab at 1:00PM    For ongoing therapy Max Daily Amount: 20 mg    Major depression in remission ([address] )    Vitamin D deficiency  -     Vitamin D 25 hydroxy; Future        PLAN:  Pt is having mild to moderate anxiety for which she refuses a steady anxiety medicine such as an SSRI or SNRI or propanolol (Options discussed )  Pt would like to stick to her current regimen and plans to try Hydroxyzine now that she is out of school and is no longer as concerned about SE of sedation  I recommended psychotherapy and Pt accepts referral to [facility] therapy team without any preferences  Treatment plan done and Pt accepts the plan  Hydroxyzine 25mg (10 tab po qd-bid prn anxiety--Pt will use her last Rx  Alprazolam 0 5mg (1) tab po qd prn anxiety # 30 R2  Amphetamine 10mg (1) tab po qAM and (1) tab po q 1:00PM # 61 + 60 + 60   Getting LFT and TFT done again in another week (ordered by an outside provider)  Get Vit D level--my order  Return 12 weeks    Risks/Benefits      Risks, Benefits And Possible Side Effects Of Medications:    Risks, benefits, and possible side effects of medications explained to Reynaldo Nava and she verbalizes understanding and agreement for treatment  Risks of medications in pregnancy explained to Reynaldo Nava  She verbalizes understanding and agrees to notify her doctor if she becomes pregnant

## 2023-06-30 ENCOUNTER — APPOINTMENT (OUTPATIENT)
Dept: OPHTHALMOLOGY | Facility: CLINIC | Age: 33
End: 2023-06-30
Payer: COMMERCIAL

## 2023-06-30 ENCOUNTER — NON-APPOINTMENT (OUTPATIENT)
Age: 33
End: 2023-06-30

## 2023-06-30 PROCEDURE — 92002 INTRM OPH EXAM NEW PATIENT: CPT

## 2023-06-30 PROCEDURE — 92250 FUNDUS PHOTOGRAPHY W/I&R: CPT
